# Patient Record
Sex: FEMALE | Race: WHITE | NOT HISPANIC OR LATINO | Employment: FULL TIME | URBAN - METROPOLITAN AREA
[De-identification: names, ages, dates, MRNs, and addresses within clinical notes are randomized per-mention and may not be internally consistent; named-entity substitution may affect disease eponyms.]

---

## 2023-09-19 ENCOUNTER — OFFICE VISIT (OUTPATIENT)
Dept: PRIMARY CARE | Facility: CLINIC | Age: 58
End: 2023-09-19
Payer: COMMERCIAL

## 2023-09-19 VITALS
OXYGEN SATURATION: 96 % | HEART RATE: 78 BPM | HEIGHT: 66 IN | TEMPERATURE: 97.8 F | SYSTOLIC BLOOD PRESSURE: 112 MMHG | WEIGHT: 185.2 LBS | DIASTOLIC BLOOD PRESSURE: 76 MMHG | BODY MASS INDEX: 29.77 KG/M2

## 2023-09-19 DIAGNOSIS — Z12.31 ENCOUNTER FOR SCREENING MAMMOGRAM FOR MALIGNANT NEOPLASM OF BREAST: ICD-10-CM

## 2023-09-19 DIAGNOSIS — Z00.00 HEALTH MAINTENANCE EXAMINATION: Primary | ICD-10-CM

## 2023-09-19 DIAGNOSIS — Z12.11 SCREEN FOR COLON CANCER: ICD-10-CM

## 2023-09-19 PROCEDURE — 1036F TOBACCO NON-USER: CPT | Performed by: FAMILY MEDICINE

## 2023-09-19 PROCEDURE — 99386 PREV VISIT NEW AGE 40-64: CPT | Performed by: FAMILY MEDICINE

## 2023-09-19 ASSESSMENT — PAIN SCALES - GENERAL: PAINLEVEL: 0-NO PAIN

## 2023-09-19 ASSESSMENT — PATIENT HEALTH QUESTIONNAIRE - PHQ9
2. FEELING DOWN, DEPRESSED OR HOPELESS: NOT AT ALL
1. LITTLE INTEREST OR PLEASURE IN DOING THINGS: NOT AT ALL
SUM OF ALL RESPONSES TO PHQ9 QUESTIONS 1 AND 2: 0

## 2023-09-19 NOTE — PROGRESS NOTES
"Reason for Visit: Annual Physical Exam    HPI:  Recently moved back from [place], originally from [place].  Has not concerns.  Overall good health.  Did have mildly elevated cholesterol therefore would like to have values rechecked.     Active Problem List  Patient Active Problem List   Diagnosis    Health maintenance examination       Comprehensive Medical/Surgical/Social/Family History  No past medical history on file.  No past surgical history on file.  Social History     Tobacco Use    Smoking status: Never    Smokeless tobacco: Never   Substance Use Topics    Alcohol use: Never    Drug use: Never     No family history on file.      Allergies and Medications  Patient has no known allergies.  No current outpatient medications on file prior to visit.     No current facility-administered medications on file prior to visit.         ROS otherwise negative aside from what was mentioned above in HPI.    Vitals  /76 (BP Location: Right arm, Patient Position: Sitting, BP Cuff Size: Small adult)   Pulse 78   Temp 36.6 °C (97.8 °F) (Oral)   Ht 1.676 m (5' 6\")   Wt 84 kg (185 lb 3.2 oz)   SpO2 96%   BMI 29.89 kg/m²   Body mass index is 29.89 kg/m².  Physical Exam  Gen: Alert, NAD  HEENT:  PERRL, EOMI, conjunctiva and sclera normal in appearance.   Neck:  Supple with FROM; No masses/nodes palpable; Thyroid nontender and without nodules; No CARIDAD  Respiratory:  Lungs CTAB  Cardiovascular:  Heart RRR. No M/R/G. Peripheral pulses equal bilaterally  Abdomen:  Soft, nontender, BS present throughout; No R/G/R; No HSM or masses palpated  Extremities:  FROM all extremities; Muscle strength grossly normal with good tone  Neuro:  CN II-XII intact; Gross motor and sensory intact  Skin:  No suspicious lesions present    Assessment and Plan:  Problem List Items Addressed This Visit       Health maintenance examination - Primary    Current Assessment & Plan     Recommended screening guidelines addressed and orders placed as " indicated by age and chronic conditions  Screening labs ordered, will call with results  Due for follow up colonoscopy, order placed  Mammogram ordered  Continue to work on healthy lifestyle including well balanced diet, regular activity, limit alcohol, no tobacco products and safe sexual practices  Follow up annually           Relevant Orders    CBC    Comprehensive Metabolic Panel    Lipid Panel    Vitamin B12    Vitamin D 25-Hydroxy,Total (for eval of Vitamin D levels)     Other Visit Diagnoses       Encounter for screening mammogram for malignant neoplasm of breast        Relevant Orders    BI mammo bilateral screening tomosynthesis    Screen for colon cancer        Relevant Orders    Colonoscopy Screening              Catrachita Aguilar MD

## 2023-09-19 NOTE — ASSESSMENT & PLAN NOTE
Recommended screening guidelines addressed and orders placed as indicated by age and chronic conditions  Screening labs ordered, will call with results  Due for follow up colonoscopy, order placed  Mammogram ordered  Continue to work on healthy lifestyle including well balanced diet, regular activity, limit alcohol, no tobacco products and safe sexual practices  Follow up annually

## 2023-09-20 ENCOUNTER — LAB (OUTPATIENT)
Dept: LAB | Facility: LAB | Age: 58
End: 2023-09-20
Payer: COMMERCIAL

## 2023-09-20 DIAGNOSIS — Z00.00 HEALTH MAINTENANCE EXAMINATION: ICD-10-CM

## 2023-09-20 LAB
ALANINE AMINOTRANSFERASE (SGPT) (U/L) IN SER/PLAS: 14 U/L (ref 7–45)
ALBUMIN (G/DL) IN SER/PLAS: 4.4 G/DL (ref 3.4–5)
ALKALINE PHOSPHATASE (U/L) IN SER/PLAS: 101 U/L (ref 33–136)
ANION GAP IN SER/PLAS: 13 MMOL/L (ref 10–20)
ASPARTATE AMINOTRANSFERASE (SGOT) (U/L) IN SER/PLAS: 15 U/L (ref 9–39)
BILIRUBIN TOTAL (MG/DL) IN SER/PLAS: 0.6 MG/DL (ref 0–1.2)
CALCIDIOL (25 OH VITAMIN D3) (NG/ML) IN SER/PLAS: 37 NG/ML
CALCIUM (MG/DL) IN SER/PLAS: 9.4 MG/DL (ref 8.6–10.6)
CARBON DIOXIDE, TOTAL (MMOL/L) IN SER/PLAS: 27 MMOL/L (ref 21–32)
CHLORIDE (MMOL/L) IN SER/PLAS: 104 MMOL/L (ref 98–107)
CHOLESTEROL (MG/DL) IN SER/PLAS: 215 MG/DL (ref 0–199)
CHOLESTEROL IN HDL (MG/DL) IN SER/PLAS: 57.4 MG/DL
CHOLESTEROL/HDL RATIO: 3.7
COBALAMIN (VITAMIN B12) (PG/ML) IN SER/PLAS: 344 PG/ML (ref 211–911)
CREATININE (MG/DL) IN SER/PLAS: 0.73 MG/DL (ref 0.5–1.05)
ERYTHROCYTE DISTRIBUTION WIDTH (RATIO) BY AUTOMATED COUNT: 12.4 % (ref 11.5–14.5)
ERYTHROCYTE MEAN CORPUSCULAR HEMOGLOBIN CONCENTRATION (G/DL) BY AUTOMATED: 32.2 G/DL (ref 32–36)
ERYTHROCYTE MEAN CORPUSCULAR VOLUME (FL) BY AUTOMATED COUNT: 93 FL (ref 80–100)
ERYTHROCYTES (10*6/UL) IN BLOOD BY AUTOMATED COUNT: 4.52 X10E12/L (ref 4–5.2)
GFR FEMALE: >90 ML/MIN/1.73M2
GLUCOSE (MG/DL) IN SER/PLAS: 84 MG/DL (ref 74–99)
HEMATOCRIT (%) IN BLOOD BY AUTOMATED COUNT: 41.9 % (ref 36–46)
HEMOGLOBIN (G/DL) IN BLOOD: 13.5 G/DL (ref 12–16)
LDL: 137 MG/DL (ref 0–99)
LEUKOCYTES (10*3/UL) IN BLOOD BY AUTOMATED COUNT: 7.5 X10E9/L (ref 4.4–11.3)
NRBC (PER 100 WBCS) BY AUTOMATED COUNT: 0 /100 WBC (ref 0–0)
PLATELETS (10*3/UL) IN BLOOD AUTOMATED COUNT: 243 X10E9/L (ref 150–450)
POTASSIUM (MMOL/L) IN SER/PLAS: 4.2 MMOL/L (ref 3.5–5.3)
PROTEIN TOTAL: 7 G/DL (ref 6.4–8.2)
SODIUM (MMOL/L) IN SER/PLAS: 140 MMOL/L (ref 136–145)
TRIGLYCERIDE (MG/DL) IN SER/PLAS: 103 MG/DL (ref 0–149)
UREA NITROGEN (MG/DL) IN SER/PLAS: 12 MG/DL (ref 6–23)
VLDL: 21 MG/DL (ref 0–40)

## 2023-09-20 PROCEDURE — 36415 COLL VENOUS BLD VENIPUNCTURE: CPT

## 2023-09-20 PROCEDURE — 82607 VITAMIN B-12: CPT

## 2023-09-20 PROCEDURE — 80053 COMPREHEN METABOLIC PANEL: CPT

## 2023-09-20 PROCEDURE — 85027 COMPLETE CBC AUTOMATED: CPT

## 2023-09-20 PROCEDURE — 80061 LIPID PANEL: CPT

## 2023-09-20 PROCEDURE — 82306 VITAMIN D 25 HYDROXY: CPT

## 2023-11-09 ENCOUNTER — ANCILLARY PROCEDURE (OUTPATIENT)
Dept: RADIOLOGY | Facility: CLINIC | Age: 58
End: 2023-11-09
Payer: COMMERCIAL

## 2023-11-09 VITALS — BODY MASS INDEX: 29.73 KG/M2 | HEIGHT: 66 IN | WEIGHT: 185 LBS

## 2023-11-09 DIAGNOSIS — Z12.31 ENCOUNTER FOR SCREENING MAMMOGRAM FOR MALIGNANT NEOPLASM OF BREAST: ICD-10-CM

## 2023-11-09 PROCEDURE — 77067 SCR MAMMO BI INCL CAD: CPT

## 2023-11-13 ENCOUNTER — HOSPITAL ENCOUNTER (OUTPATIENT)
Dept: RADIOLOGY | Facility: EXTERNAL LOCATION | Age: 58
Discharge: HOME | End: 2023-11-13
Payer: COMMERCIAL

## 2024-11-22 ENCOUNTER — APPOINTMENT (OUTPATIENT)
Dept: RADIOLOGY | Facility: CLINIC | Age: 59
End: 2024-11-22
Payer: COMMERCIAL

## 2024-11-22 DIAGNOSIS — Z12.31 SCREENING MAMMOGRAM FOR BREAST CANCER: ICD-10-CM

## 2024-12-05 ENCOUNTER — APPOINTMENT (OUTPATIENT)
Dept: RADIOLOGY | Facility: CLINIC | Age: 59
End: 2024-12-05
Payer: COMMERCIAL

## 2024-12-07 ENCOUNTER — HOSPITAL ENCOUNTER (OUTPATIENT)
Dept: RADIOLOGY | Facility: CLINIC | Age: 59
Discharge: HOME | End: 2024-12-07
Payer: COMMERCIAL

## 2024-12-07 VITALS — WEIGHT: 183 LBS | HEIGHT: 66 IN | BODY MASS INDEX: 29.41 KG/M2

## 2024-12-07 DIAGNOSIS — Z12.31 SCREENING MAMMOGRAM FOR BREAST CANCER: ICD-10-CM

## 2024-12-07 PROCEDURE — 77067 SCR MAMMO BI INCL CAD: CPT

## 2024-12-07 PROCEDURE — 77063 BREAST TOMOSYNTHESIS BI: CPT | Performed by: STUDENT IN AN ORGANIZED HEALTH CARE EDUCATION/TRAINING PROGRAM

## 2024-12-07 PROCEDURE — 77067 SCR MAMMO BI INCL CAD: CPT | Performed by: STUDENT IN AN ORGANIZED HEALTH CARE EDUCATION/TRAINING PROGRAM
